# Patient Record
Sex: MALE | Race: WHITE | NOT HISPANIC OR LATINO | URBAN - METROPOLITAN AREA
[De-identification: names, ages, dates, MRNs, and addresses within clinical notes are randomized per-mention and may not be internally consistent; named-entity substitution may affect disease eponyms.]

---

## 2018-05-17 ENCOUNTER — TELEPHONE (OUTPATIENT)
Dept: TRANSPLANT | Facility: CLINIC | Age: 63
End: 2018-05-17

## 2018-05-17 DIAGNOSIS — Z00.5 TRANSPLANT DONOR EVALUATION: Primary | ICD-10-CM

## 2018-05-17 NOTE — TELEPHONE ENCOUNTER
Is abo compat. Hx Squamous Cell CA on leg,face. Moh's Procedure in 2017.No chemo or XRT.Will sign CARINA for records. Will send Phase 1 orders with pkt.

## 2018-05-17 NOTE — TELEPHONE ENCOUNTER
"[facility] B[facility]ZE    [id number]     LIVING KIDNEY DONOR EVALUATION  Donor First Name Kiara Donor MERYL    Donor Last Name Harsha Completed 2018 4:42 PM   1955 Record ID [id number]  BREEZE Screen PASSED      Intended Recipient  Recipient First Name Jaime Recipient MRN    Recipient Last Name Lelia Relationship Cousin  Recipient  1952 Recipient Diagnosis    Recipient's ABO        Donor Information  Age 63 Gender Male  Ht 175 cm (5' 9'') Race   Wt 81.6 kg (180 lbs) Ethnicity Not /  BMI 26.60 kg/m  Preferred Language English       Required No      Blood Type B  Demographics  Home Address [address] [place] [address] # [id number]  [facility] # [phone]  Zip Code [address] Type Work - Private  Country [place] Preferred Contact day Mon, Fri, Wed  Email [email or web address] Preferred Contact time 11:00 AM-1:00 PM, 1:00 PM-4:00 PM  Donor's Medical Information  Medical History BPH   Depression   GERD   COMFORT ( on PAP )   Skin Cancer NOS   Squamous Cell Carcinoma Head/Neck dx  no XRT / no Chemo   Stress Tests, Normal    Urge Incontinence Medications Bupropion   Tamsulosin   Zantac  Surgical History Circumcision   Tympanoplasty   Vasectomy Allergies Aleve : Wheezing and/or Bronchospasm, ANAPHYLAXIS, Edema  Social History EtOH: Occasional (1-2 drinks/week)   Illicit Drug Use: Denies   Tobacco: Denies Self-Reported Functional Status \"I am able to participate in strenuous sports such as swimming, singles tennis, football, basketball, or skiing\"  Family Medical History Cancer (denies)   Diabetes (denies)   Heart Disease (denies)   Hypertension (denies)   Kidney Disease (Cousin)   Kidney Stones (Sibling) Exercise Frequency Exercise (3 X per week)  Review of Organ Systems  Review of Systems Airway or Lungs: Yes   Blood Disorder: No   Cancer: Yes   Diabetes,Thyroid,Adrenal,Endocrine Disorder: No   Digestive or Liver: Yes   Heart or " Circulatory System: No   Immune Diseases: No   Kidneys and Bladder: Yes   Male Health: Yes   Muscles,Bones,Joints: No   Neuro: No   Psych: Yes  Donor's Social Information  Marital Status  Living Accommodation Owns own home/apartment  Level of Education Graduate or professional degree complete Living Arrangement With spouse  Employment Status Full Time Concerns: health and life insurance Yes  Employer [employer] Concerns: job security and lost income No  Occupation        Medical Insurance Status Has medical insurance      High Risk Behavior  High Risk Behaviors Blood transfusion < 12 months. (NO)   Commercial sex < 12 months. (NO)   Illicit IV drug use < 5yrs. (NO)   Male:male sexual contact < 5yrs. (NO)   Other high risk sexual contact < 12 months. (NO)  Reason for Donation  Referral Friend or Family of Tx Candidate Reason for Donation My cousin is in kidney failure.  Permission to Disclose Inquiry Yes Patient Comments    Donor Motivation Level Ready to start evaluation with reservations      PCP Contact  PCP Name Marisol Vazquez  PCP [facility]  PCP Phone [phone]  Emergency Contact  First Name Nirali First Name Yehuda  Last Name Jose MiguelLelia Last Name Lelia  Phone # [phone] Phone # [phone]  Phone Type Mobile Phone Type Mobile  Relationship Spouse Relationship Sibling  Office Use  Reviewed By Jewels Pablo 5/10/2018 7:24 AM  Admin Folder Accept  Comments    Lost for Followup Not Checked  Extended Comments    [facility] ID [id number] survey status completed